# Patient Record
Sex: FEMALE | Race: WHITE | Employment: FULL TIME
[De-identification: names, ages, dates, MRNs, and addresses within clinical notes are randomized per-mention and may not be internally consistent; named-entity substitution may affect disease eponyms.]

---

## 2022-06-13 ENCOUNTER — OFFICE VISIT (OUTPATIENT)
Dept: INTERNAL MEDICINE CLINIC | Facility: CLINIC | Age: 62
End: 2022-06-13
Payer: COMMERCIAL

## 2022-06-13 VITALS
HEIGHT: 62 IN | DIASTOLIC BLOOD PRESSURE: 80 MMHG | TEMPERATURE: 97.2 F | BODY MASS INDEX: 22.71 KG/M2 | WEIGHT: 123.4 LBS | SYSTOLIC BLOOD PRESSURE: 132 MMHG

## 2022-06-13 DIAGNOSIS — Z12.31 ENCOUNTER FOR SCREENING MAMMOGRAM FOR MALIGNANT NEOPLASM OF BREAST: ICD-10-CM

## 2022-06-13 DIAGNOSIS — I10 PRIMARY HYPERTENSION: Primary | ICD-10-CM

## 2022-06-13 PROCEDURE — 99213 OFFICE O/P EST LOW 20 MIN: CPT | Performed by: INTERNAL MEDICINE

## 2022-06-13 ASSESSMENT — PATIENT HEALTH QUESTIONNAIRE - PHQ9
SUM OF ALL RESPONSES TO PHQ QUESTIONS 1-9: 0
SUM OF ALL RESPONSES TO PHQ9 QUESTIONS 1 & 2: 0
1. LITTLE INTEREST OR PLEASURE IN DOING THINGS: 0
2. FEELING DOWN, DEPRESSED OR HOPELESS: 0
SUM OF ALL RESPONSES TO PHQ QUESTIONS 1-9: 0

## 2022-06-13 ASSESSMENT — ENCOUNTER SYMPTOMS
ABDOMINAL PAIN: 0
SHORTNESS OF BREATH: 0
CONSTIPATION: 0
ABDOMINAL DISTENTION: 0
COUGH: 0
CHEST TIGHTNESS: 0

## 2022-06-13 NOTE — PROGRESS NOTES
Chief Complaint   Patient presents with    Follow-up     4 week follow up        Jm Kaufman is a 58 y.o. female who presents today for Follow-up (4 week follow up)  Since last visit she completed her labs showing normal liver, kidney function test, thyroid, blood count, and hepatitis C testing. Her potassium is slightly high, she reports has a high diet in bananas and [place]. Has been checking her blood pressure at home, and her readings are below 140/90. She brought the blood pressure cuff today, and her blood pressure continue to be within acceptable range. And is correlating with our reading. She denies any chest pain, shortness of breath, headaches, she is trying to exercise more, walking at least 5 miles per day. And has been able to lose 3 pounds since last visit      Wt Readings from Last 3 Encounters:   06/13/22 123 lb 6.4 oz (56 kg)   05/09/22 126 lb 12.8 oz (57.5 kg)     Vitals:    06/13/22 1413   BP: 132/80   Site: Left Upper Arm   Position: Sitting   Temp: 97.2 °F (36.2 °C)   TempSrc: Temporal   Weight: 123 lb 6.4 oz (56 kg)   Height: 5' 1.5\" (1.562 m)        Assessment and plan:  1. Primary hypertension  Assessment & Plan:  Complete her BMP again, to recheck her potassium level  Will continue with lifestyle modification  Goal 140/90 or less  We will follow-up in 3 months  Orders:  -     Basic Metabolic Panel; Future  2. Encounter for screening mammogram for malignant neoplasm of breast  -     [facility]JO DIGITAL SCREEN BILATERAL; Future      Return in about 3 months (around 9/13/2022) for HTN. Review of system:    Review of Systems   Constitutional: Negative for activity change, chills, fatigue and fever. Respiratory: Negative for cough, chest tightness and shortness of breath. Cardiovascular: Negative for chest pain. Gastrointestinal: Negative for abdominal distention, abdominal pain and constipation.    Musculoskeletal: Myalgias: leg pain in left side , no trauma , no swelling. Neurological: Negative for dizziness and headaches. Psychiatric/Behavioral: The patient is not nervous/anxious. Immunization history:    Immunization History   Administered Date(s) Administered    Tdap (Boostrix, Adacel) 06/16/2014       Current medications:    No current outpatient medications on file. Family history:    Family History   Problem Relation Age of Onset    Hypertension Mother         Past medical history:    Past Medical History:   Diagnosis Date    Heart murmur           Physical exam:    /80 (Site: Left Upper Arm, Position: Sitting)   Temp 97.2 °F (36.2 °C) (Temporal)   Ht 5' 1.5\" (1.562 m)   Wt 123 lb 6.4 oz (56 kg)   BMI 22.94 kg/m²     Physical Exam  Vitals reviewed. Constitutional:       Appearance: Normal appearance. HENT:      Head: Normocephalic. Cardiovascular:      Rate and Rhythm: Normal rate. Pulmonary:      Effort: Pulmonary effort is normal.   Musculoskeletal:      Right lower leg: No edema. Left lower leg: No edema. Neurological:      General: No focal deficit present. Mental Status: She is alert.    Psychiatric:         Mood and Affect: Mood normal.          Recent labs:    No results found for: CHOL  No results found for: TRIG  No results found for: HDL  No results found for: LDLCHOLESTEROL, LDLCALC  No results found for: LABVLDL, VLDL  No results found for: [facility]  Lab Results   Component Value Date     05/09/2022    K 5.4 (H) 05/09/2022     05/09/2022    CO2 23 05/09/2022    BUN 13 05/09/2022    CREATININE 0.67 05/09/2022    GLUCOSE 90 05/09/2022    CALCIUM 9.7 05/09/2022    PROT 6.8 05/09/2022    LABALBU 4.6 05/09/2022    BILITOT 0.3 05/09/2022    ALKPHOS 106 05/09/2022    AST 30 05/09/2022    ALT 31 05/09/2022    AGRATIO 2.1 05/09/2022     Lab Results   Component Value Date    WBC 6.6 05/09/2022    HGB 14.0 05/09/2022    HCT 42.9 05/09/2022    MCV 93 05/09/2022     05/09/2022             This document was generated with the aid of voice recognition software. . Please be aware that there may be inadvertent transcription errors not identified and corrected by the [employer]

## 2022-06-13 NOTE — PATIENT INSTRUCTIONS
Patient Education        DASH Diet: Care Instructions  Your Care Instructions     The DASH diet is an eating plan that can help lower your blood pressure. DASH stands for Dietary Approaches to Stop Hypertension. Hypertension is high bloodpressure. The DASH diet focuses on eating foods that are high in calcium, potassium, and magnesium. These nutrients can lower blood pressure. The foods that are highest in these nutrients are fruits, vegetables, low-fat dairy products, nuts, seeds, and legumes. But taking calcium, potassium, and magnesium supplements instead of eating foods that are high in those nutrients does not have the same effect. The DASH diet also includes whole grains, fish, and poultry. The DASH diet is one of several lifestyle changes your doctor may recommend to lower your high blood pressure. Your doctor may also want you to decrease the amount of sodium in your diet. Lowering sodium while following the DASH dietcan lower blood pressure even further than just the DASH diet alone. Follow-up care is a key part of your treatment and safety. Be sure to make and go to all appointments, and call your doctor if you are having problems. It's also a good idea to know your test results and keep alist of the medicines you take. How can you care for yourself at home? Following the DASH diet   Eat 4 to 5 servings of fruit each day. A serving is 1 medium-sized piece of fruit, ½ cup chopped or canned fruit, 1/4 cup dried fruit, or 4 ounces (½ cup) of fruit juice. Choose fruit more often than fruit juice.  Eat 4 to 5 servings of vegetables each day. A serving is 1 cup of lettuce or raw leafy vegetables, ½ cup of chopped or cooked vegetables, or 4 ounces (½ cup) of vegetable juice. Choose vegetables more often than vegetable juice.  Get 2 to 3 servings of low-fat and fat-free dairy each day. A serving is 8 ounces of milk, 1 cup of yogurt, or 1 ½ ounces of cheese.  Eat 6 to 8 servings of grains each day.  A serving is 1 slice of bread, 1 ounce of dry cereal, or ½ cup of cooked rice, pasta, or cooked cereal. Try to choose whole-grain products as much as possible.  Limit lean meat, poultry, and fish to 2 servings each day. A serving is 3 ounces, about the size of a deck of cards.  Eat 4 to 5 servings of nuts, seeds, and legumes (cooked dried beans, lentils, and split peas) each week. A serving is 1/3 cup of nuts, 2 tablespoons of seeds, or ½ cup of cooked beans or peas.  Limit fats and oils to 2 to 3 servings each day. A serving is 1 teaspoon of vegetable oil or 2 tablespoons of salad dressing.  Limit sweets and added sugars to 5 servings or less a week. A serving is 1 tablespoon jelly or jam, ½ cup sorbet, or 1 cup of lemonade.  Eat less than 2,300 milligrams (mg) of sodium a day. If you limit your sodium to 1,500 mg a day, you can lower your blood pressure even more.  Be aware that all of these are the suggested number of servings for people who eat 1,800 to 2,000 calories a day. Your recommended number of servings may be different if you need more or fewer calories. Tips for success   Start small. Do not try to make dramatic changes to your diet all at once. You might feel that you are missing out on your favorite foods and then be more likely to not follow the plan. Make small changes, and stick with them. Once those changes become habit, add a few more changes.  Try some of the following:  ? Make it a goal to eat a fruit or vegetable at every meal and at snacks. This will make it easy to get the recommended amount of fruits and vegetables each day. ? Try yogurt topped with fruit and nuts for a snack or healthy dessert. ? Add lettuce, tomato, cucumber, and onion to sandwiches. ? Combine a ready-made pizza crust with low-fat mozzarella cheese and lots of vegetable toppings. Try using tomatoes, squash, spinach, broccoli, carrots, cauliflower, and onions. ?  Have a variety of cut-up vegetables with a low-fat dip as an appetizer instead of chips and dip. ? Sprinkle sunflower seeds or chopped almonds over salads. Or try adding chopped walnuts or almonds to cooked vegetables. ? Try some vegetarian meals using beans and peas. Add garbanzo or kidney beans to salads. Make burritos and tacos with mashed orellana beans or black beans. Where can you learn more? Go to https://[employer].hipix. org and sign in to your [employer] account. Enter K282 in the [facility] box to learn more about \"DASH Diet: Care Instructions. \"     If you do not have an account, please click on the \"Sign Up Now\" link. Current as of: January 10, 2022               Content Version: 13.2  © 2750-6948 [employer]. Care instructions adapted under license by [facility] If you have questions about a medical condition or this instruction, always ask your healthcare professional. [address] any warranty or liability for your use of this information.

## 2022-06-13 NOTE — ASSESSMENT & PLAN NOTE
Complete her BMP again, to recheck her potassium level  Will continue with lifestyle modification  Goal 140/90 or less  We will follow-up in 3 months